# Patient Record
Sex: MALE | Race: WHITE | NOT HISPANIC OR LATINO | URBAN - METROPOLITAN AREA
[De-identification: names, ages, dates, MRNs, and addresses within clinical notes are randomized per-mention and may not be internally consistent; named-entity substitution may affect disease eponyms.]

---

## 2019-01-30 ENCOUNTER — EMERGENCY (EMERGENCY)
Facility: HOSPITAL | Age: 59
LOS: 1 days | End: 2019-01-30
Attending: EMERGENCY MEDICINE
Payer: COMMERCIAL

## 2019-01-30 VITALS
TEMPERATURE: 98 F | OXYGEN SATURATION: 97 % | HEART RATE: 97 BPM | HEIGHT: 68 IN | WEIGHT: 315 LBS | SYSTOLIC BLOOD PRESSURE: 183 MMHG | RESPIRATION RATE: 18 BRPM | DIASTOLIC BLOOD PRESSURE: 97 MMHG

## 2019-01-30 PROCEDURE — 12042 INTMD RPR N-HF/GENIT2.6-7.5: CPT | Mod: GC

## 2019-01-30 PROCEDURE — 73130 X-RAY EXAM OF HAND: CPT | Mod: 26,LT

## 2019-01-30 PROCEDURE — 99283 EMERGENCY DEPT VISIT LOW MDM: CPT | Mod: 25,GC

## 2019-01-30 PROCEDURE — 73130 X-RAY EXAM OF HAND: CPT

## 2019-01-30 PROCEDURE — 99283 EMERGENCY DEPT VISIT LOW MDM: CPT | Mod: 25

## 2019-01-30 PROCEDURE — 12042 INTMD RPR N-HF/GENIT2.6-7.5: CPT

## 2019-01-30 RX ORDER — CEPHALEXIN 500 MG
1 CAPSULE ORAL
Qty: 28 | Refills: 0 | OUTPATIENT
Start: 2019-01-30 | End: 2019-02-05

## 2019-01-30 NOTE — ED PROVIDER NOTE - OBJECTIVE STATEMENT
60yo RHD male p/w left hand laceration. PT. reports using  and sustained laceration to dorsal lateral hand. Pt. reports significant bleeding that was stopped with pressure. Pt. placed in dressing by EMS. Pt. reports slight pain to site of injury. Denies weakness, numbness, tingling. 60yo RHD male p/w left hand laceration. PT. reports using  and sustained laceration to dorsal lateral hand. Pt. reports significant bleeding that was stopped with pressure. Pt. placed in dressing by EMS. Pt. reports slight pain to site of injury. Denies weakness, numbness, tingling. Tetanus up to date.

## 2019-01-30 NOTE — ED ADULT TRIAGE NOTE - CHIEF COMPLAINT QUOTE
L hand lac from , bleeding controlled by EMS with gauze and cling, no active bleeding at this time.  Tetanus UTD

## 2019-01-30 NOTE — ED PROVIDER NOTE - ATTENDING CONTRIBUTION TO CARE
59y male pmh Hypertension,dm, on duty at [employer] complains of lac to left hand with . TD UTD, no weakness numbness. PE Lac to left hand post over 2sd mc, all tendons intact. Good pulse.sensation,radius/ulnar/median nerves normal.  Samuel Welsh MD, [facility]

## 2019-01-30 NOTE — ED PROVIDER NOTE - NSFOLLOWUPINSTRUCTIONS_ED_ALL_ED_FT
You were seen in the Emergency Department for hand laceration. It was repaired. Please follow up with any physician in 48 hours for a wound check and in 7 days for suture removal. Take the antibiotics as written. Please return to the Emergency Department if you have any new concerning symptoms such as severe pain, weakness, or any other concerning symptoms.

## 2019-01-30 NOTE — ED PROVIDER NOTE - MEDICAL DECISION MAKING DETAILS
60yo male p/w left dorsal hand laceration from . No tendon involvement. Will obtain xray due to slight tenderness to metacarpal. Irrigate and repair laceration.

## 2019-01-30 NOTE — ED PROVIDER NOTE - SKIN WOUND DESCRIPTION
5cm doral hand laceration, linear with exposed adipose tissue, normal extensor tendon strength and ROM, normal sensation
